# Patient Record
Sex: FEMALE | Race: WHITE | NOT HISPANIC OR LATINO | URBAN - NONMETROPOLITAN AREA
[De-identification: names, ages, dates, MRNs, and addresses within clinical notes are randomized per-mention and may not be internally consistent; named-entity substitution may affect disease eponyms.]

---

## 2017-03-09 DIAGNOSIS — M79.641 RIGHT HAND PAIN: Primary | ICD-10-CM

## 2017-03-13 ENCOUNTER — OFFICE VISIT (OUTPATIENT)
Dept: ORTHOPEDIC SURGERY | Facility: CLINIC | Age: 82
End: 2017-03-13

## 2017-03-13 VITALS
DIASTOLIC BLOOD PRESSURE: 76 MMHG | HEIGHT: 58 IN | SYSTOLIC BLOOD PRESSURE: 128 MMHG | BODY MASS INDEX: 35.68 KG/M2 | WEIGHT: 170 LBS | HEART RATE: 76 BPM

## 2017-03-13 DIAGNOSIS — M65.331 TRIGGER MIDDLE FINGER OF RIGHT HAND: Primary | ICD-10-CM

## 2017-03-13 PROBLEM — J45.909 ASTHMA: Status: ACTIVE | Noted: 2017-03-13

## 2017-03-13 PROBLEM — G47.30 SLEEP APNEA: Status: ACTIVE | Noted: 2017-03-13

## 2017-03-13 PROBLEM — Q24.9 CARDIAC ARRHYTHMIA DUE TO CONGENITAL HEART DISEASE: Status: ACTIVE | Noted: 2017-03-13

## 2017-03-13 PROBLEM — M19.90 OSTEOARTHRITIS: Status: ACTIVE | Noted: 2017-03-13

## 2017-03-13 PROCEDURE — 99203 OFFICE O/P NEW LOW 30 MIN: CPT | Performed by: ORTHOPAEDIC SURGERY

## 2017-03-13 PROCEDURE — 20550 NJX 1 TENDON SHEATH/LIGAMENT: CPT | Performed by: ORTHOPAEDIC SURGERY

## 2017-03-13 RX ORDER — METHYLPREDNISOLONE ACETATE 40 MG/ML
40 INJECTION, SUSPENSION INTRA-ARTICULAR; INTRALESIONAL; INTRAMUSCULAR; SOFT TISSUE
Status: COMPLETED | OUTPATIENT
Start: 2017-03-13 | End: 2017-03-13

## 2017-03-13 RX ORDER — FLUTICASONE PROPIONATE 50 MCG
SPRAY, SUSPENSION (ML) NASAL
COMMUNITY
Start: 2016-12-15

## 2017-03-13 RX ORDER — ATORVASTATIN CALCIUM 10 MG/1
TABLET, FILM COATED ORAL
COMMUNITY
Start: 2017-02-15

## 2017-03-13 RX ORDER — METOPROLOL SUCCINATE 25 MG/1
TABLET, EXTENDED RELEASE ORAL
COMMUNITY
Start: 2017-01-24

## 2017-03-13 RX ORDER — SULFAMETHOXAZOLE AND TRIMETHOPRIM 800; 160 MG/1; MG/1
TABLET ORAL
COMMUNITY
Start: 2017-03-07 | End: 2017-03-13 | Stop reason: HOSPADM

## 2017-03-13 RX ORDER — ASPIRIN 81 MG/1
TABLET, CHEWABLE ORAL
COMMUNITY
Start: 2013-12-17

## 2017-03-13 RX ORDER — APIXABAN 5 MG/1
TABLET, FILM COATED ORAL
COMMUNITY
Start: 2017-02-03

## 2017-03-13 RX ADMIN — METHYLPREDNISOLONE ACETATE 40 MG: 40 INJECTION, SUSPENSION INTRA-ARTICULAR; INTRALESIONAL; INTRAMUSCULAR; SOFT TISSUE at 11:46

## 2017-03-13 NOTE — PROGRESS NOTES
New Patient Visit      Patient: Annie German  YOB: 1930  Date of Encounter: 03/13/2017          History of Present Illness:       86-year-old white female seen today referred by Dr. Vang secondary to right third digit stiffness locking several years duration. Patient states that this progressively worsened over the course of 4-5 years with intermittent locking was easily reduced. She reports that approximately 10 days ago this became chronically flexed and was unable to be manually reduced until last evening when she caught her finger on the side of a bar extending her finger. She now has soreness with popping sensation upon flexion of her finger. Patient denies any prior treatment to this. She describes a dull throbbing aching pain to the palmar aspect of her right hand.       Patient Active Problem List   Diagnosis   • Asthma   • Osteoarthritis   • Cardiac arrhythmia due to congenital heart disease   • Sleep apnea   • Trigger middle finger of right hand     History reviewed. No pertinent past medical history.  Past Surgical History   Procedure Laterality Date   • Cardiac catheterization     • Aortic valve surgery     • Carpal tunnel release Right      Social History     Occupational History   • Not on file.     Social History Main Topics   • Smoking status: Never Smoker   • Smokeless tobacco: Not on file   • Alcohol use No   • Drug use: No   • Sexual activity: Not on file      Social History     Social History Narrative   • No narrative on file     Family History   Problem Relation Age of Onset   • Diabetes Mother    • Hypertension Mother    • Osteoarthritis Father    • Heart disease Father    • Diabetes Sister    • Hypertension Sister    • Heart disease Brother    • Diabetes Brother    • Hypertension Brother      Current Outpatient Prescriptions   Medication Sig Dispense Refill   • aspirin 81 MG chewable tablet Chew.     • folic acid-vit B6-vit B12 (FOLTABS) 0.8-10-0.115 MG tablet tablet Take   "by mouth Daily.     • Multiple Vitamin (MULTI VITAMIN) tablet Take  by mouth.     • atorvastatin (LIPITOR) 10 MG tablet      • ELIQUIS 5 MG tablet tablet      • fluticasone (FLONASE) 50 MCG/ACT nasal spray      • metoprolol succinate XL (TOPROL-XL) 25 MG 24 hr tablet        No current facility-administered medications for this visit.      Allergies not on file     Review of Systems   Constitution: Positive for malaise/fatigue.   HENT: Positive for hearing loss and sore throat.    Eyes: Negative.    Cardiovascular: Negative.    Respiratory: Positive for shortness of breath.    Endocrine: Negative.    Hematologic/Lymphatic: Negative.    Skin: Negative.    Musculoskeletal:        Pertinent positives listed in HPI   Gastrointestinal: Negative.    Genitourinary: Negative.    Neurological: Positive for focal weakness and light-headedness.   Psychiatric/Behavioral: The patient has insomnia.    Allergic/Immunologic: Negative.        Vitals:    03/13/17 1117   BP: 128/76   Pulse: 76   Weight: 170 lb (77.1 kg)   Height: 57.5\" (146.1 cm)       Physical Exam: 86 y.o. female .  General Appearance:    Well appearing, cooperative, in no acute distress.       Patient is alert and oriented x 3.            Musculoskeletal: bilateral hand exam reveals multi-level joint changes with ulnar deviation and swan-necking of the DIP's. Palpable nodule of the A1 pulley right 3rd digit. There is popping with no current locking sensation. There is palmar swelling. Patient has full motion of all other digits. No significant swelling, ecchymosis or erythema. Neurovascular status grossly intact.      Procedure:  Right 3rd digit A1 pulley  Date/Time: 3/13/2017 11:46 AM  Performed by: SHELLY TRAVIS  Authorized by: SHELLY TRAVIS   Consent: Verbal consent obtained.  Consent given by: patient  Local anesthesia used: yes  Anesthesia: local infiltration    Anesthesia:  Local anesthesia used: yes  Anesthesia: local infiltration  Local " Anesthetic: lidocaine 2% without epinephrine   Medications administered: 40 mg methylPREDNISolone acetate 40 MG/ML          Assesment:    ICD-10-CM ICD-9-CM   1. Trigger middle finger of right hand M65.331 727.03         Plan:    Patient tolerated the injection well. She was instructed to return back upon any return or complication of her previous symptoms. We would proceed with Trigger finger release at that time. She is knowing and understanding of the risks, benefits, and future outcomes of the surgery as were discussed with her today. This would be done under local anesthesia to forego any cardiac clearance. Follow up as needed. She was instructed to allow 2-3 weeks before evaluating the efficacy of the injection.    Discussion/Summary:    Written by Carmine Abdul PA-C, acting as scribe for Dr. Brian SUAREZ, Martin Torres MD, personally performed the services described in this documentation as scribed by the above named individual in my presence, and it is both accurate and complete.  3/13/2017  1:34 PM      This document was signed by Carmine Abdul PA-C March 13, 2017

## 2017-11-27 ENCOUNTER — LAB REQUISITION (OUTPATIENT)
Dept: LAB | Facility: HOSPITAL | Age: 82
End: 2017-11-27

## 2017-11-27 DIAGNOSIS — I10 ESSENTIAL (PRIMARY) HYPERTENSION: ICD-10-CM

## 2017-11-27 LAB
ANION GAP SERPL CALCULATED.3IONS-SCNC: 7.7 MMOL/L (ref 3.6–11.2)
BUN BLD-MCNC: 13 MG/DL (ref 7–21)
BUN/CREAT SERPL: 15.7 (ref 7–25)
CALCIUM SPEC-SCNC: 8.7 MG/DL (ref 7.7–10)
CHLORIDE SERPL-SCNC: 102 MMOL/L (ref 99–112)
CHOLEST SERPL-MCNC: 118 MG/DL (ref 0–200)
CO2 SERPL-SCNC: 25.3 MMOL/L (ref 24.3–31.9)
CREAT BLD-MCNC: 0.83 MG/DL (ref 0.43–1.29)
DEPRECATED RDW RBC AUTO: 46.5 FL (ref 37–54)
ERYTHROCYTE [DISTWIDTH] IN BLOOD BY AUTOMATED COUNT: 14.3 % (ref 11.5–14.5)
GFR SERPL CREATININE-BSD FRML MDRD: 65 ML/MIN/1.73
GLUCOSE BLD-MCNC: 139 MG/DL (ref 70–110)
HCT VFR BLD AUTO: 42.3 % (ref 37–47)
HDLC SERPL-MCNC: 45 MG/DL (ref 60–100)
HGB BLD-MCNC: 13.6 G/DL (ref 12–16)
LDLC SERPL CALC-MCNC: 55 MG/DL (ref 0–100)
LDLC/HDLC SERPL: 1.23 {RATIO}
MCH RBC QN AUTO: 30.2 PG (ref 27–33)
MCHC RBC AUTO-ENTMCNC: 32.2 G/DL (ref 33–37)
MCV RBC AUTO: 93.8 FL (ref 80–94)
OSMOLALITY SERPL CALC.SUM OF ELEC: 272.5 MOSM/KG (ref 273–305)
PLATELET # BLD AUTO: 289 10*3/MM3 (ref 130–400)
PMV BLD AUTO: 9.7 FL (ref 6–10)
POTASSIUM BLD-SCNC: 4.3 MMOL/L (ref 3.5–5.3)
RBC # BLD AUTO: 4.51 10*6/MM3 (ref 4.2–5.4)
SODIUM BLD-SCNC: 135 MMOL/L (ref 135–153)
TRIGL SERPL-MCNC: 89 MG/DL (ref 0–150)
VLDLC SERPL-MCNC: 17.8 MG/DL
WBC NRBC COR # BLD: 8.63 10*3/MM3 (ref 4.5–12.5)

## 2017-11-27 PROCEDURE — 85027 COMPLETE CBC AUTOMATED: CPT | Performed by: INTERNAL MEDICINE

## 2017-11-27 PROCEDURE — 80061 LIPID PANEL: CPT | Performed by: INTERNAL MEDICINE

## 2017-11-27 PROCEDURE — 80048 BASIC METABOLIC PNL TOTAL CA: CPT | Performed by: INTERNAL MEDICINE

## 2020-09-09 ENCOUNTER — LAB REQUISITION (OUTPATIENT)
Dept: LAB | Facility: HOSPITAL | Age: 85
End: 2020-09-09

## 2020-09-09 DIAGNOSIS — E11.9 TYPE 2 DIABETES MELLITUS WITHOUT COMPLICATIONS (HCC): ICD-10-CM

## 2020-09-09 LAB
ANION GAP SERPL CALCULATED.3IONS-SCNC: 14.4 MMOL/L (ref 5–15)
BUN SERPL-MCNC: 22 MG/DL (ref 8–23)
BUN/CREAT SERPL: 21 (ref 7–25)
CALCIUM SPEC-SCNC: 9.7 MG/DL (ref 8.6–10.5)
CHLORIDE SERPL-SCNC: 96 MMOL/L (ref 98–107)
CO2 SERPL-SCNC: 27.6 MMOL/L (ref 22–29)
CREAT SERPL-MCNC: 1.05 MG/DL (ref 0.57–1)
GFR SERPL CREATININE-BSD FRML MDRD: 49 ML/MIN/1.73
GLUCOSE SERPL-MCNC: 89 MG/DL (ref 65–99)
POTASSIUM SERPL-SCNC: 4.2 MMOL/L (ref 3.5–5.2)
SODIUM SERPL-SCNC: 138 MMOL/L (ref 136–145)

## 2020-09-09 PROCEDURE — 80048 BASIC METABOLIC PNL TOTAL CA: CPT | Performed by: INTERNAL MEDICINE

## 2020-09-11 ENCOUNTER — LAB REQUISITION (OUTPATIENT)
Dept: LAB | Facility: HOSPITAL | Age: 85
End: 2020-09-11

## 2020-09-11 DIAGNOSIS — D64.9 ANEMIA, UNSPECIFIED: ICD-10-CM

## 2020-09-11 DIAGNOSIS — I10 ESSENTIAL (PRIMARY) HYPERTENSION: ICD-10-CM

## 2020-09-11 LAB
ALBUMIN SERPL-MCNC: 3.79 G/DL (ref 3.5–5.2)
ALBUMIN/GLOB SERPL: 1.1 G/DL
ALP SERPL-CCNC: 91 U/L (ref 39–117)
ALT SERPL W P-5'-P-CCNC: 15 U/L (ref 1–33)
ANION GAP SERPL CALCULATED.3IONS-SCNC: 13.6 MMOL/L (ref 5–15)
AST SERPL-CCNC: 25 U/L (ref 1–32)
BASOPHILS # BLD AUTO: 0.06 10*3/MM3 (ref 0–0.2)
BASOPHILS NFR BLD AUTO: 0.7 % (ref 0–1.5)
BILIRUB SERPL-MCNC: 0.7 MG/DL (ref 0–1.2)
BUN SERPL-MCNC: 19 MG/DL (ref 8–23)
BUN/CREAT SERPL: 19.8 (ref 7–25)
CALCIUM SPEC-SCNC: 9.5 MG/DL (ref 8.6–10.5)
CHLORIDE SERPL-SCNC: 100 MMOL/L (ref 98–107)
CO2 SERPL-SCNC: 23.4 MMOL/L (ref 22–29)
CREAT SERPL-MCNC: 0.96 MG/DL (ref 0.57–1)
DEPRECATED RDW RBC AUTO: 51.1 FL (ref 37–54)
EOSINOPHIL # BLD AUTO: 0.24 10*3/MM3 (ref 0–0.4)
EOSINOPHIL NFR BLD AUTO: 2.9 % (ref 0.3–6.2)
ERYTHROCYTE [DISTWIDTH] IN BLOOD BY AUTOMATED COUNT: 14.6 % (ref 12.3–15.4)
GFR SERPL CREATININE-BSD FRML MDRD: 55 ML/MIN/1.73
GLOBULIN UR ELPH-MCNC: 3.3 GM/DL
GLUCOSE SERPL-MCNC: 106 MG/DL (ref 65–99)
HCT VFR BLD AUTO: 41.8 % (ref 34–46.6)
HGB BLD-MCNC: 13.2 G/DL (ref 12–15.9)
IMM GRANULOCYTES # BLD AUTO: 0.03 10*3/MM3 (ref 0–0.05)
IMM GRANULOCYTES NFR BLD AUTO: 0.4 % (ref 0–0.5)
LYMPHOCYTES # BLD AUTO: 1.96 10*3/MM3 (ref 0.7–3.1)
LYMPHOCYTES NFR BLD AUTO: 23.5 % (ref 19.6–45.3)
MCH RBC QN AUTO: 30.3 PG (ref 26.6–33)
MCHC RBC AUTO-ENTMCNC: 31.6 G/DL (ref 31.5–35.7)
MCV RBC AUTO: 96.1 FL (ref 79–97)
MONOCYTES # BLD AUTO: 0.67 10*3/MM3 (ref 0.1–0.9)
MONOCYTES NFR BLD AUTO: 8 % (ref 5–12)
NEUTROPHILS NFR BLD AUTO: 5.37 10*3/MM3 (ref 1.7–7)
NEUTROPHILS NFR BLD AUTO: 64.5 % (ref 42.7–76)
NRBC BLD AUTO-RTO: 0 /100 WBC (ref 0–0.2)
PLATELET # BLD AUTO: 238 10*3/MM3 (ref 140–450)
PMV BLD AUTO: 10 FL (ref 6–12)
POTASSIUM SERPL-SCNC: 4.1 MMOL/L (ref 3.5–5.2)
PROT SERPL-MCNC: 7.1 G/DL (ref 6–8.5)
RBC # BLD AUTO: 4.35 10*6/MM3 (ref 3.77–5.28)
SODIUM SERPL-SCNC: 137 MMOL/L (ref 136–145)
WBC # BLD AUTO: 8.33 10*3/MM3 (ref 3.4–10.8)

## 2020-09-11 PROCEDURE — 80053 COMPREHEN METABOLIC PANEL: CPT | Performed by: INTERNAL MEDICINE

## 2020-09-11 PROCEDURE — 85025 COMPLETE CBC W/AUTO DIFF WBC: CPT | Performed by: INTERNAL MEDICINE

## 2021-07-16 DIAGNOSIS — M54.2 NECK PAIN: Primary | ICD-10-CM

## 2021-07-20 ENCOUNTER — OFFICE VISIT (OUTPATIENT)
Dept: ORTHOPEDIC SURGERY | Facility: CLINIC | Age: 86
End: 2021-07-20

## 2021-07-20 ENCOUNTER — HOSPITAL ENCOUNTER (OUTPATIENT)
Dept: GENERAL RADIOLOGY | Facility: HOSPITAL | Age: 86
End: 2021-07-20

## 2021-07-20 VITALS
HEART RATE: 74 BPM | WEIGHT: 130 LBS | BODY MASS INDEX: 28.05 KG/M2 | SYSTOLIC BLOOD PRESSURE: 134 MMHG | HEIGHT: 57 IN | DIASTOLIC BLOOD PRESSURE: 73 MMHG

## 2021-07-20 DIAGNOSIS — M62.838 MUSCLE SPASM: ICD-10-CM

## 2021-07-20 DIAGNOSIS — M79.10 MUSCLE PAIN: ICD-10-CM

## 2021-07-20 DIAGNOSIS — M54.2 NECK PAIN: Primary | ICD-10-CM

## 2021-07-20 DIAGNOSIS — M25.611 STIFFNESS OF RIGHT SHOULDER JOINT: ICD-10-CM

## 2021-07-20 DIAGNOSIS — M25.511 CHRONIC RIGHT SHOULDER PAIN: ICD-10-CM

## 2021-07-20 DIAGNOSIS — G89.29 CHRONIC RIGHT SHOULDER PAIN: ICD-10-CM

## 2021-07-20 DIAGNOSIS — M48.02 FORAMINAL STENOSIS OF CERVICAL REGION: ICD-10-CM

## 2021-07-20 DIAGNOSIS — M48.02 CERVICAL SPINAL STENOSIS: ICD-10-CM

## 2021-07-20 DIAGNOSIS — M50.30 DDD (DEGENERATIVE DISC DISEASE), CERVICAL: ICD-10-CM

## 2021-07-20 DIAGNOSIS — S46.001A ROTATOR CUFF INJURY, RIGHT, INITIAL ENCOUNTER: Chronic | ICD-10-CM

## 2021-07-20 PROCEDURE — 99203 OFFICE O/P NEW LOW 30 MIN: CPT | Performed by: FAMILY MEDICINE

## 2021-07-20 RX ORDER — BACLOFEN 10 MG/1
10 TABLET ORAL 3 TIMES DAILY
COMMUNITY

## 2021-07-20 RX ORDER — CHOLECALCIFEROL (VITAMIN D3) 125 MCG
50 CAPSULE ORAL
COMMUNITY

## 2021-07-20 RX ORDER — CETIRIZINE HYDROCHLORIDE 10 MG/1
10 TABLET ORAL DAILY
COMMUNITY

## 2021-07-20 RX ORDER — AMOXICILLIN 250 MG
1 CAPSULE ORAL DAILY
COMMUNITY

## 2021-07-20 NOTE — PROGRESS NOTES
New Patient Visit      Patient: Annie German  YOB: 1930  Date of Encounter: 07/20/2021  PCP: Enrike Vang MD  Referring Provider: No ref. provider found     Subjective   Annie German is a 89 y.o. female who presents to the office today for evaluation of Pain, Initial Evaluation, and Stiffness of the Cervical Spine and Pain and Initial Evaluation of the Right Shoulder      Chief Complaint:   Chief Complaint   Patient presents with   • Cervical Spine - Pain, Initial Evaluation, Stiffness   • Right Shoulder - Pain, Initial Evaluation       HPI:   HPI  New patient who presents accompanied by her daughter Evonne complaining of right-sided neck pain and shoulder pain without injury since May 2021.  Went to the ER and had x-rays which showed arthritis in her neck and shoulder.  PCP put her on a baclofen once daily which is helping.  Denies any radiating numbness or tingling.  Feels like she is overall weaker than she used to be.  Pain in the right shoulder on any movement above 90 degrees.  Patient does have a history of a rotator cuff injury in that shoulder from January 2013 and a loose body.  Also has IM steroid shot in the ER which is giving her some relief  Active Problem List:  Patient Active Problem List   Diagnosis   • Asthma   • Osteoarthritis   • Cardiac arrhythmia due to congenital heart disease   • Sleep apnea   • Trigger middle finger of right hand   • Compression fracture of T12 vertebra (CMS/HCC)   • Stiffness of right shoulder joint   • DDD (degenerative disc disease), cervical   • Cervical spinal stenosis   • Foraminal stenosis of cervical region   • Rotator cuff injury, right, initial encounter   • Chronic right shoulder pain       Past Medical History:  Past Medical History:   Diagnosis Date   • Arthritis    • Back ache    • Bleeding    • Hearing loss    • Heart disease    • Heart failure (CMS/HCC)    • Hemorrhoids    • History of blood transfusion    • Osteoporosis    • Stroke  (CMS/[facility])        Past Surgical History:  Past Surgical History:   Procedure Laterality Date   • AORTIC VALVE SURGERY     • AORTIC VALVE SURGERY     • BREAST BIOPSY     • CARDIAC CATHETERIZATION     • CARPAL TUNNEL RELEASE Right    • CARPAL TUNNEL RELEASE Right    • TONSILLECTOMY     • WRIST SURGERY         Family History:  Family History   Problem Relation Age of Onset   • Diabetes Mother    • Hypertension Mother    • Osteoarthritis Father    • Heart disease Father    • Arthritis Father    • Diabetes Sister    • Hypertension Sister    • Heart disease Brother    • Diabetes Brother    • Hypertension Brother        Social History:  Social History     Socioeconomic History   • Marital status:      Spouse name: Not on file   • Number of children: Not on file   • Years of education: Not on file   • Highest education level: Not on file   Tobacco Use   • Smoking status: Never Smoker   • Smokeless tobacco: Never Used   Vaping Use   • Vaping Use: Never used   Substance and Sexual Activity   • Alcohol use: No   • Drug use: No       Medications:  Current Outpatient Medications   Medication Sig Dispense Refill   • aspirin 81 MG chewable tablet Chew.     • atorvastatin (LIPITOR) 10 MG tablet      • baclofen (LIORESAL) 10 MG tablet Take 10 mg by mouth 3 (Three) Times a Day.     • cetirizine (zyrTEC) 10 MG tablet Take 10 mg by mouth Daily.     • Cholecalciferol (Vitamin D3) 50 MCG (2000 UT) tablet Take 50 mcg by mouth.     • ELIQUIS 5 MG tablet tablet      • metoprolol succinate XL (TOPROL-XL) 25 MG 24 hr tablet      • Multiple Vitamin (MULTI VITAMIN) tablet Take  by mouth.     • Omega-3 Fatty Acids (OMEGA 3 500 PO) Take 540 mg by mouth.     • sennosides-docusate (Sennalax-S) 8.6-50 MG per tablet Take 1 tablet by mouth Daily.     • Simethicone (GAS-X PO) Take 125 mg by mouth.     • fluticasone (FLONASE) 50 MCG/ACT nasal spray      • folic acid-vit B6-vit B12 (FOLTABS) 0.8-10-0.115 MG tablet tablet Take  by mouth Daily.        No current facility-administered medications for this visit.       Allergies:  Allergies   Allergen Reactions   • Penicillins Other (See Comments)     Break out al over and skin fell off. Was told to never take it again. Everything in the penicillin family.       Review of Systems:   Review of Systems   Constitutional: Positive for activity change. Negative for fever.   Respiratory: Negative for shortness of breath.    Cardiovascular: Negative for chest pain.   Musculoskeletal: Positive for arthralgias, myalgias, neck pain and neck stiffness.   Neurological: Negative for weakness and numbness.       Physical Exam:   Physical Exam  Vitals and nursing note reviewed.   Constitutional:       General: She is not in acute distress.     Appearance: Normal appearance.   Neck:      Comments: Right-sided trapezius pain and spasm.  Positive Spurling bilaterally.  Head forward posture  Pulmonary:      Effort: Pulmonary effort is normal. No respiratory distress.      Comments: Using supplemental oxygen per NC  Musculoskeletal:      Right shoulder: Tenderness present. No crepitus. Decreased range of motion. Normal pulse.      Right hand: Normal strength.      Left hand: Normal strength.      Cervical back: Crepitus present. Pain with movement and muscular tenderness present. No spinous process tenderness. Decreased range of motion.      Comments: Slumped kyphotic posture.  Patient in a wheelchair.  Using supplemental oxygen.    Right shoulder restricted range of motion with endrange pain generally.  Pain on resisted testing of supraspinatus, positive impingement and AC testing.  No weakness.  Rotator cuff biceps triceps intact   Skin:     General: Skin is warm and dry.      Findings: No erythema.   Neurological:      General: No focal deficit present.      Mental Status: She is alert.      Sensory: No sensory deficit.      Motor: No weakness.       GENERAL: 89 y.o. female, alert and oriented X 3 in no acute distress.   Visit  "Vitals  /73   Pulse 74   Ht 144.8 cm (57\")   Wt 59 kg (130 lb)   BMI 28.13 kg/m²       Radiology Results:    X-ray right shoulder: 5/28/2021  Narrowing of the acromial humeral interval which can be seen with rotator cuff disease.  Small calcified joint body in the subcoracoid region measuring 7 mm.  No fractures or acute abnormalities    CT cervical spine without contrast 5/28/2021  No acute fracture.  Bones are osteopenic.  Severe diffuse degenerative disc changes.  Mild anterolisthesis at C6-7.  Multilevel central canal and bilateral neuroforaminal stenosis.      CT thoracic spine without contrast 9/2/2020  Age-indeterminate compression fractures at T6,T12 inferior endplate of T12 appearing acute.    Procedures    Assessment & Plan:   Assessment/Plan   Diagnoses and all orders for this visit:    1. Neck pain (Primary)    2. Muscle spasm    3. Muscle pain    4. Chronic right shoulder pain    5. Rotator cuff injury, right, initial encounter    6. Stiffness of right shoulder joint    7. DDD (degenerative disc disease), cervical    8. Cervical spinal stenosis    9. Foraminal stenosis of cervical region        MEDICATION ISSUES:  Discussed medication options and treatment plan of prescribed medication as well as the risks, benefits, and side effects including potential falls, possible impaired driving and metabolic adversities among others. Patient is agreeable to call the office with any worsening of symptoms or onset of side effects. Patient is agreeable to call 911 or go to the nearest ER should he/she begin having SI/HI.     MEDS ORDERED DURING VISIT:  No orders of the defined types were placed in this encounter.    Patient has significant chronic musculoskeletal issues including an old rotator cuff tear in the right shoulder.  Contacted patient's cardiologist Dr. Garibay see if she can have topical Voltaren gel since she is on a blood thinner  Referring patient to physical therapy and showed her how to do " neck stretches.  Patient will follow up for OMT treatments.           This document has been electronically signed by Cleveland Uribe DO   July 26, 2021 11:20 EDT    Part of this note may be an electronic transcription/translation of spoken language to printed text using the Dragon Dictation System.

## 2021-07-26 PROBLEM — S22.080A COMPRESSION FRACTURE OF T12 VERTEBRA (HCC): Chronic | Status: ACTIVE | Noted: 2021-07-26

## 2021-07-26 PROBLEM — M25.511 CHRONIC RIGHT SHOULDER PAIN: Status: ACTIVE | Noted: 2021-07-26

## 2021-07-26 PROBLEM — M25.512 CHRONIC LEFT SHOULDER PAIN: Status: ACTIVE | Noted: 2021-07-26

## 2021-07-26 PROBLEM — M48.02 CERVICAL SPINAL STENOSIS: Status: ACTIVE | Noted: 2021-07-26

## 2021-07-26 PROBLEM — S46.001A ROTATOR CUFF INJURY, RIGHT, INITIAL ENCOUNTER: Chronic | Status: ACTIVE | Noted: 2021-07-26

## 2021-07-26 PROBLEM — M50.30 DDD (DEGENERATIVE DISC DISEASE), CERVICAL: Status: ACTIVE | Noted: 2021-07-26

## 2021-07-26 PROBLEM — M25.611 STIFFNESS OF RIGHT SHOULDER JOINT: Status: ACTIVE | Noted: 2021-07-26

## 2021-07-26 PROBLEM — M48.02 FORAMINAL STENOSIS OF CERVICAL REGION: Status: ACTIVE | Noted: 2021-07-26

## 2021-07-26 PROBLEM — G89.29 CHRONIC LEFT SHOULDER PAIN: Status: ACTIVE | Noted: 2021-07-26

## 2021-07-28 ENCOUNTER — OFFICE VISIT (OUTPATIENT)
Dept: ORTHOPEDIC SURGERY | Facility: CLINIC | Age: 86
End: 2021-07-28

## 2021-07-28 VITALS
DIASTOLIC BLOOD PRESSURE: 63 MMHG | HEART RATE: 75 BPM | SYSTOLIC BLOOD PRESSURE: 118 MMHG | HEIGHT: 57 IN | WEIGHT: 130 LBS | BODY MASS INDEX: 28.05 KG/M2

## 2021-07-28 DIAGNOSIS — M99.01 SEGMENTAL AND SOMATIC DYSFUNCTION OF CERVICAL REGION: ICD-10-CM

## 2021-07-28 DIAGNOSIS — M62.838 MUSCLE SPASM: ICD-10-CM

## 2021-07-28 DIAGNOSIS — M48.02 FORAMINAL STENOSIS OF CERVICAL REGION: ICD-10-CM

## 2021-07-28 DIAGNOSIS — G89.29 CHRONIC RIGHT SHOULDER PAIN: ICD-10-CM

## 2021-07-28 DIAGNOSIS — M50.30 DDD (DEGENERATIVE DISC DISEASE), CERVICAL: ICD-10-CM

## 2021-07-28 DIAGNOSIS — M25.511 CHRONIC RIGHT SHOULDER PAIN: ICD-10-CM

## 2021-07-28 DIAGNOSIS — M54.2 NECK PAIN: Primary | ICD-10-CM

## 2021-07-28 PROCEDURE — 98927 OSTEOPATH MANJ 5-6 REGIONS: CPT | Performed by: FAMILY MEDICINE

## 2021-07-28 NOTE — PROGRESS NOTES
"Follow Up Visit      Patient: Annie German  YOB: 1930  Date of Encounter: 07/28/2021  No ref. provider found     Subjective   Annie German is a 89 y.o. female who presents to the office today for Osteopathic Manipulative Treatment for Pain and Follow-up of the Right Shoulder and Pain and Follow-up of the Cervical Spine      HPI:   HPI  Patient presents with her daughter for OMT treatment.  Reports that her neck and shoulder are improving with therapy.  However her low back is hurting from laying on a hard table while getting an echocardiogram earlier today so she does not want to lay down for treatment.  Allergies:  Allergies   Allergen Reactions   • Penicillins Other (See Comments)     Break out al over and skin fell off. Was told to never take it again. Everything in the penicillin family.       Physical Exam:   Visit Vitals  /63   Pulse 75   Ht 144.8 cm (57\")   Wt 59 kg (130 lb)   BMI 28.13 kg/m²     OMT Procedure  Indications: TART findings, muscle spasm, pain    Risks and benefits discussed and patient gave verbal consent to treat    Regions treated: Head, C-Spine, Ribs, Upper Extremity, T- Spine and L-Spine    Findings: Head Suboccipital restriction, Cervical Spine Generalized cervical motion restriction, C3 ERS R, C4 ERS R, C5 FRS L or C6 FRS L, Upper Extremity Left shoulder restriction or Right shoulder Restriction, Ribs 1-2 Left exhaled, Thoracic Spine Generalized Thoracic Restriction or Lumbar Spine Generalized Lumbar Restriction    Modalities: Muscle Energy, Counterstrain, Direct Myofascial Release, Indirect Myofascial Release and Still Technique    Patient reports decreased pain, improved range of motion, and improved functionality after treatment. There were no adverse effects.    Assessment & Plan:   Assessment/Plan   Diagnoses and all orders for this visit:    1. Neck pain (Primary)    2. Muscle spasm    3. Chronic right shoulder pain    4. DDD (degenerative disc disease), " cervical    5. Foraminal stenosis of cervical region    6. Segmental and somatic dysfunction of cervical region      Follow-up in 2 weeks for reevaluation and further treatment.  Continue PT and home exercise regimen.  Still waiting to hear from patient's cardiologist if she can have Voltaren topical gel         This document has been electronically signed by Cleveland Uribe DO   July 28, 2021 12:01 EDT    Part of this note may be an electronic transcription/translation of spoken language to printed text using the Dragon Dictation System.